# Patient Record
Sex: FEMALE | Race: WHITE | NOT HISPANIC OR LATINO | Employment: FULL TIME | URBAN - METROPOLITAN AREA
[De-identification: names, ages, dates, MRNs, and addresses within clinical notes are randomized per-mention and may not be internally consistent; named-entity substitution may affect disease eponyms.]

---

## 2020-02-03 ENCOUNTER — APPOINTMENT (OUTPATIENT)
Dept: RADIOLOGY | Facility: CLINIC | Age: 50
End: 2020-02-03
Payer: COMMERCIAL

## 2020-02-03 ENCOUNTER — OFFICE VISIT (OUTPATIENT)
Dept: URGENT CARE | Facility: CLINIC | Age: 50
End: 2020-02-03
Payer: COMMERCIAL

## 2020-02-03 VITALS
HEART RATE: 91 BPM | WEIGHT: 218 LBS | TEMPERATURE: 99 F | BODY MASS INDEX: 37.22 KG/M2 | HEIGHT: 64 IN | OXYGEN SATURATION: 96 % | SYSTOLIC BLOOD PRESSURE: 142 MMHG | DIASTOLIC BLOOD PRESSURE: 76 MMHG | RESPIRATION RATE: 18 BRPM

## 2020-02-03 DIAGNOSIS — R05.9 COUGH: Primary | ICD-10-CM

## 2020-02-03 DIAGNOSIS — R05.9 COUGH: ICD-10-CM

## 2020-02-03 PROCEDURE — 99213 OFFICE O/P EST LOW 20 MIN: CPT | Performed by: PHYSICIAN ASSISTANT

## 2020-02-03 PROCEDURE — 71046 X-RAY EXAM CHEST 2 VIEWS: CPT

## 2020-02-03 RX ORDER — LISINOPRIL 10 MG/1
10 TABLET ORAL DAILY
COMMUNITY

## 2020-02-03 RX ORDER — BENZONATATE 200 MG/1
200 CAPSULE ORAL 3 TIMES DAILY PRN
Qty: 20 CAPSULE | Refills: 0 | Status: SHIPPED | OUTPATIENT
Start: 2020-02-03

## 2020-02-03 RX ORDER — ESOMEPRAZOLE MAGNESIUM 40 MG/1
40 CAPSULE, DELAYED RELEASE ORAL 2 TIMES DAILY
COMMUNITY
Start: 2020-01-07

## 2020-02-03 RX ORDER — ALBUTEROL SULFATE 90 UG/1
2 AEROSOL, METERED RESPIRATORY (INHALATION) EVERY 6 HOURS PRN
Qty: 8.5 G | Refills: 0 | Status: SHIPPED | OUTPATIENT
Start: 2020-02-03 | End: 2020-03-13

## 2020-02-03 NOTE — PROGRESS NOTES
[address] [facility]        NAME: Preeti Garcia is a 54 y o  female  : 1970    MRN: [id number]  DATE: February 3, 2020  TIME: 12:58 PM    Assessment and Plan   Cough [R05]  1  Cough  XR chest pa & lateral    albuterol (PROAIR HFA) 90 mcg/act inhaler    benzonatate (TESSALON) 200 MG capsule         Patient Instructions     Use inhaler as directed  Take tessalon as directed  Encourage lots of fluids and rest  Discussed etiology is most likely viral  Follow up with PCP in 3-5 days  Proceed to  ER if symptoms worsen  Chief Complaint     Chief Complaint   Patient presents with    Cough     4 weeks, just keeps getting worse         History of Present Illness       Patient presents with complaint of cough x 4 weeks  Pt reports that she has been taking mucinex every 4 hours without any improvement in her symptoms  She also reports taking cough drops  Pt reports that she had a fever of 101 when she first started coughing but denies fever and chills since  Pt reports having night sweats frequently d/t menopause  She reports coughing to the point of vomiting today  She denies blood in sputum  She reports that her chest and back ache from all the coughing  She reports recent sick contacts  She denies getting a flu shot this year  Review of Systems   Review of Systems   Constitutional: Positive for fatigue  Negative for chills and fever  HENT: Positive for congestion and postnasal drip  Negative for ear pain and sore throat  Respiratory: Positive for cough  Negative for chest tightness, shortness of breath and wheezing  Cardiovascular: Negative for chest pain and palpitations  Musculoskeletal: Negative for myalgias  Skin: Negative for color change, rash and wound  Neurological: Negative for headaches  All other systems reviewed and are negative          Current Medications       Current Outpatient Medications:     esomeprazole (NexIUM) 40 MG capsule, Take 40 mg by mouth 2 (two) times a day, Disp: , Rfl:     lisinopril (ZESTRIL) 10 mg tablet, Take 10 mg by mouth daily, Disp: , Rfl:     albuterol (PROAIR HFA) 90 mcg/act inhaler, Inhale 2 puffs every 6 (six) hours as needed for wheezing or shortness of breath, Disp: 8 5 g, Rfl: 0    benzonatate (TESSALON) 200 MG capsule, Take 1 capsule (200 mg total) by mouth 3 (three) times a day as needed for cough, Disp: 20 capsule, Rfl: 0    Current Allergies     Allergies as of 02/03/2020    (No Known Allergies)            The following portions of the patient's history were reviewed and updated as appropriate: allergies, current medications, past family history, past medical history, past social history, past surgical history and problem list      History reviewed  No pertinent past medical history  History reviewed  No pertinent surgical history  History reviewed  No pertinent family history  Medications have been verified  Objective   /76   Pulse 91   Temp 99 °F (37 2 °C)   Resp 18   Ht 5' 4" (1 626 m)   Wt 98 9 kg (218 lb)   SpO2 96%   BMI 37 42 kg/m²        Physical Exam     Physical Exam   Constitutional: She is oriented to person, place, and time  She appears well-developed and well-nourished  No distress  Coughing   HENT:   Head: Normocephalic and atraumatic  Right Ear: External ear normal    Left Ear: External ear normal    Nose: Nose normal    Mouth/Throat: Oropharynx is clear and moist  No oropharyngeal exudate  Eyes: Pupils are equal, round, and reactive to light  Conjunctivae and EOM are normal  Right eye exhibits no discharge  Left eye exhibits no discharge  Neck: Normal range of motion  Neck supple  Cardiovascular: Normal rate, regular rhythm and normal heart sounds  Pulmonary/Chest: Effort normal and breath sounds normal  No stridor  No respiratory distress  She has no wheezes  She has no rales  Lymphadenopathy:     She has no cervical adenopathy     Neurological: She is alert and oriented to person, place, and time  No cranial nerve deficit or sensory deficit  Skin: Skin is warm and dry  Capillary refill takes less than 2 seconds  No rash noted  She is not diaphoretic  Psychiatric: She has a normal mood and affect  Her behavior is normal  Thought content normal    Nursing note and vitals reviewed

## 2020-02-03 NOTE — PATIENT INSTRUCTIONS
Use inhaler as directed  Take tessalon as directed  Encourage lots of fluids and rest  Discussed etiology is most likely viral  Follow up with PCP in 3-5 days  Go to ER if symptoms worsen

## 2020-02-20 ENCOUNTER — HOSPITAL ENCOUNTER (EMERGENCY)
Facility: HOSPITAL | Age: 50
Discharge: HOME | End: 2020-02-20
Attending: EMERGENCY MEDICINE | Admitting: EMERGENCY MEDICINE
Payer: COMMERCIAL

## 2020-02-20 VITALS
BODY MASS INDEX: 35.82 KG/M2 | RESPIRATION RATE: 16 BRPM | TEMPERATURE: 97.2 F | SYSTOLIC BLOOD PRESSURE: 150 MMHG | DIASTOLIC BLOOD PRESSURE: 85 MMHG | HEART RATE: 97 BPM | OXYGEN SATURATION: 98 % | HEIGHT: 65 IN | WEIGHT: 215 LBS

## 2020-02-20 DIAGNOSIS — R05.9 COUGH: Primary | ICD-10-CM

## 2020-02-20 DIAGNOSIS — R05.4 COUGH SYNCOPE: ICD-10-CM

## 2020-02-20 DIAGNOSIS — R55 COUGH SYNCOPE: ICD-10-CM

## 2020-02-20 PROCEDURE — 99283 EMERGENCY DEPT VISIT LOW MDM: CPT

## 2020-02-20 PROCEDURE — 93005 ELECTROCARDIOGRAM TRACING: CPT

## 2020-02-20 PROCEDURE — 99284 EMERGENCY DEPT VISIT MOD MDM: CPT | Performed by: EMERGENCY MEDICINE

## 2020-02-20 NOTE — ED PROVIDER NOTES
History  Chief Complaint   Patient presents with    Cough     Pt reports having a cough x 6 weeks @1530 pt took a gulp of water and choked on it, she woke up on the ground and "i felt myself convulsing and then i peed"  Denies pain     This 79-year-old female with history of hypertension has had a nonproductive cough for approximately 6 weeks  She felt she had some subjective fever the 1st few days my not since then  Paroxysms of cough come and go  When she lays down at night his gets more difficult to breathe and she coughs more so she has been sleeping some erect  Today she was drinking water when coughing spells began  She started choking on the water and believes he aspirated some  She then woke up on the floor  She felt tremors at noted she had small amount urinary incontinence  Currently she is back to baseline  Patient has some chest muscle tenderness but no other chest pain, hemoptysis, dyspnea, palpitations, pain or swelling in extremities  Patient denies recent trauma, surgery, immobilization or long distance travel  Patient denies personal or family history of thrombophilia  She does not take oral contraceptives nor smoke  Prior to Admission Medications   Prescriptions Last Dose Informant Patient Reported? Taking?    albuterol (PROAIR HFA) 90 mcg/act inhaler   No No   Sig: Inhale 2 puffs every 6 (six) hours as needed for wheezing or shortness of breath   benzonatate (TESSALON) 200 MG capsule   No No   Sig: Take 1 capsule (200 mg total) by mouth 3 (three) times a day as needed for cough   esomeprazole (NexIUM) 40 MG capsule   Yes No   Sig: Take 40 mg by mouth 2 (two) times a day   lisinopril (ZESTRIL) 10 mg tablet   Yes No   Sig: Take 10 mg by mouth daily      Facility-Administered Medications: None       Past Medical History:   Diagnosis Date    Hypertension        Past Surgical History:   Procedure Laterality Date    BREAST SURGERY      CARPAL TUNNEL RELEASE       SECTION         History reviewed  No pertinent family history  I have reviewed and agree with the history as documented  Social History     Tobacco Use    Smoking status: Former Smoker     Types: Cigarettes    Smokeless tobacco: Never Used   Substance Use Topics    Alcohol use: Not Currently     Frequency: Never    Drug use: Never       Review of Systems   Constitutional: Negative  HENT: Negative  Eyes: Negative  Respiratory: Positive for cough  Negative for wheezing and stridor  Cardiovascular: Negative  Gastrointestinal: Negative  Endocrine: Negative  Genitourinary: Negative  Musculoskeletal: Negative  Skin: Negative  Allergic/Immunologic: Negative  Neurological: Negative  Hematological: Negative  Psychiatric/Behavioral: Negative  All other systems reviewed and are negative  Physical Exam  Physical Exam   Constitutional: She is oriented to person, place, and time  She appears well-developed and well-nourished  No distress  HENT:   Head: Normocephalic and atraumatic  Mouth/Throat: Oropharynx is clear and moist    Eyes: Pupils are equal, round, and reactive to light  Conjunctivae and EOM are normal    Neck: Normal range of motion  Neck supple  No JVD present  Cardiovascular: Normal rate, regular rhythm and intact distal pulses  No murmur heard  Pulmonary/Chest: Effort normal and breath sounds normal  No respiratory distress  She has no wheezes  She has no rales  Abdominal: Soft  Bowel sounds are normal  She exhibits no distension  There is no tenderness  Musculoskeletal: Normal range of motion  She exhibits no edema  Neurological: She is alert and oriented to person, place, and time  She has normal reflexes  No cranial nerve deficit  Coordination normal    Skin: Skin is warm and dry  Capillary refill takes less than 2 seconds  No rash noted  She is not diaphoretic  Psychiatric: She has a normal mood and affect     Nursing note and vitals reviewed  Vital Signs  ED Triage Vitals [02/20/20 1633]   Temperature Pulse Respirations Blood Pressure SpO2   (!) 97 2 °F (36 2 °C) 97 16 150/85 98 %      Temp Source Heart Rate Source Patient Position - Orthostatic VS BP Location FiO2 (%)   Temporal Monitor Sitting Right arm --      Pain Score       6           Vitals:    02/20/20 1633   BP: 150/85   Pulse: 97   Patient Position - Orthostatic VS: Sitting         Visual Acuity      ED Medications  Medications - No data to display    Diagnostic Studies  Results Reviewed     None                 No orders to display              Procedures  ECG 12 Lead Documentation Only  Date/Time: 2/20/2020 5:10 PM  Performed by: Jade Mercado DO  Authorized by: Jade Mercado DO     ECG reviewed by me, the ED Provider: yes    Patient location:  ED  Previous ECG:     Previous ECG:  Unavailable  Interpretation:     Interpretation: normal               ED Course                               MDM  Number of Diagnoses or Management Options  Cough syncope:   Cough:   Diagnosis management comments: Patient with stable vital signs and normal exam   History not suggestive of more serious underlying pathology  Likely vasovagal episode due to choking while coughing  Amount and/or Complexity of Data Reviewed  Review and summarize past medical records: yes  Independent visualization of images, tracings, or specimens: yes          Disposition  Final diagnoses:   Cough   Cough syncope     Time reflects when diagnosis was documented in both MDM as applicable and the Disposition within this note     Time User Action Codes Description Comment    2/20/2020  5:33 PM Vladimir Felt Add [R05] Cough     2/20/2020  5:33 PM Vladimir Needles Add [R05] Cough syncope       ED Disposition     ED Disposition Condition Date/Time Comment    Discharge Stable Thu Feb 20, 2020  5:33 PM Allegra Simeon discharge to home/self care              Follow-up Information     Follow up With Specialties Details Why Contact Info Additional Information    [facility] Schedule an appointment as soon as possible for a visit  Regarding your cough [phone]  [facility] [phone], Tonya Galvan, [place], [place], [address] [place] [address]          Patient's Medications   Discharge Prescriptions    No medications on file     No discharge procedures on file      PDMP Review     None          ED Provider  Electronically Signed by           Nimo Hare DO  02/20/20 0327

## 2020-02-20 NOTE — DISCHARGE INSTRUCTIONS
Let the pulmonary office know that you were here and we think you should be seen soon  Follow-up with family doctor as well  Return if problem arises

## 2020-02-21 LAB
ATRIAL RATE: 77 BPM
P AXIS: 57 DEGREES
PR INTERVAL: 178 MS
QRS AXIS: 53 DEGREES
QRSD INTERVAL: 84 MS
QT INTERVAL: 372 MS
QTC INTERVAL: 420 MS
T WAVE AXIS: 50 DEGREES
VENTRICULAR RATE: 77 BPM

## 2020-02-21 PROCEDURE — 93010 ELECTROCARDIOGRAM REPORT: CPT | Performed by: INTERNAL MEDICINE

## 2020-02-28 ENCOUNTER — OFFICE VISIT (OUTPATIENT)
Dept: PULMONOLOGY | Facility: CLINIC | Age: 50
End: 2020-02-28
Payer: COMMERCIAL

## 2020-02-28 VITALS
DIASTOLIC BLOOD PRESSURE: 86 MMHG | TEMPERATURE: 98.1 F | RESPIRATION RATE: 18 BRPM | HEIGHT: 65 IN | BODY MASS INDEX: 35.49 KG/M2 | HEART RATE: 85 BPM | OXYGEN SATURATION: 96 % | WEIGHT: 213 LBS | SYSTOLIC BLOOD PRESSURE: 130 MMHG

## 2020-02-28 DIAGNOSIS — F17.201 MODERATE TOBACCO DEPENDENCE IN SUSTAINED REMISSION: ICD-10-CM

## 2020-02-28 DIAGNOSIS — R05.8 ALLERGIC COUGH: Primary | ICD-10-CM

## 2020-02-28 DIAGNOSIS — K21.9 GASTROESOPHAGEAL REFLUX DISEASE WITHOUT ESOPHAGITIS: ICD-10-CM

## 2020-02-28 PROCEDURE — 99204 OFFICE O/P NEW MOD 45 MIN: CPT | Performed by: INTERNAL MEDICINE

## 2020-02-28 RX ORDER — FERROUS SULFATE 325(65) MG
1 TABLET ORAL 2 TIMES DAILY
COMMUNITY
Start: 2020-02-13

## 2020-02-28 RX ORDER — LEVOCETIRIZINE DIHYDROCHLORIDE 5 MG/1
5 TABLET, FILM COATED ORAL EVERY EVENING
Qty: 30 TABLET | Refills: 3 | Status: SHIPPED | OUTPATIENT
Start: 2020-02-28 | End: 2020-07-21

## 2020-02-28 RX ORDER — FLUTICASONE PROPIONATE 50 MCG
1 SPRAY, SUSPENSION (ML) NASAL DAILY
Qty: 1 BOTTLE | Refills: 5 | Status: SHIPPED | OUTPATIENT
Start: 2020-02-28

## 2020-02-28 RX ORDER — LISINOPRIL AND HYDROCHLOROTHIAZIDE 20; 12.5 MG/1; MG/1
2 TABLET ORAL DAILY
COMMUNITY
Start: 2020-02-13

## 2020-02-28 RX ORDER — MONTELUKAST SODIUM 10 MG/1
10 TABLET ORAL
Qty: 30 TABLET | Refills: 3 | Status: SHIPPED | OUTPATIENT
Start: 2020-02-28 | End: 2020-07-21

## 2020-02-28 NOTE — ASSESSMENT & PLAN NOTE
Suspect her cough is secondary to her sinus symptoms  I recommend initiating sinus washes    Start nasal steroid spray  Start allergy medicine such as Levocetirizine  Start Montelukast

## 2020-02-28 NOTE — ASSESSMENT & PLAN NOTE
No known airway obstruction  Currently does not have symptoms of breathlessness  Check PFTs in future if indicated

## 2020-02-28 NOTE — PROGRESS NOTES
Pulmonary Consultation   Sharyn Araujo 54 y o  female MRN: [id number]  2/28/2020      Referring provider: Dr Delmis Fang  Reason for consult: Cough    Assessment and Plan:  Allergic cough  Suspect her cough is secondary to her sinus symptoms  I recommend initiating sinus washes  Start nasal steroid spray  Start allergy medicine such as Levocetirizine  Start Montelukast    GERD (gastroesophageal reflux disease)  - Cont  PPI   - Cont  Diet modification  - Symptoms seem controlled, doubt they are the cause of her cough  Moderate tobacco dependence in sustained remission  No known airway obstruction  Currently does not have symptoms of breathlessness  Check PFTs in future if indicated  Diagnoses and all orders for this visit:    Allergic cough  -     fluticasone (FLONASE) 50 mcg/act nasal spray; 1 spray into each nostril daily  -     montelukast (SINGULAIR) 10 mg tablet; Take 1 tablet (10 mg total) by mouth daily at bedtime  -     levocetirizine (XYZAL) 5 MG tablet; Take 1 tablet (5 mg total) by mouth every evening    Gastroesophageal reflux disease without esophagitis    Moderate tobacco dependence in sustained remission    Other orders  -     FEROSUL 325 (65 Fe) MG tablet; Take 1 tablet by mouth 2 (two) times a day  -     lisinopril-hydrochlorothiazide (PRINZIDE,ZESTORETIC) 20-12 5 MG per tablet; Take 2 tablets by mouth daily     She will call to schedule appointment for further evaluation if symptoms do not improve with the above regimen  History of Present Illness   HPI:  Sharyn Araujo is a 54 y o  female who presents for evaluation of cough  She was sick six weeks ago with a fever  She has had a persistent cough  It was initially productive but now it is dry  It is worse when she lies flat  She has tried cough medication  She was prescribed an Albuterol inhaler, which she finds helpful  She has taken multiple antitussives with some varying relief  She was once told she had asthma   She is not short of breath  She can ambulate at least a block without stopping  She has seasonal allergies  She has constant sinus troubles  She has had surgery in the past  She is using Afrin nightly  She has known GERD  She takes a PPI BID       Review of Systems  GEN: no fever or chills  HENT: +sinus congestion, no postnasal drip, no rhinorrhea  EYES: no visual changes  RESP: no shortness of breath, +cough, no wheezing  CARDIO: no chest pain, no leg edema  GI: no abdominal pain, no diarrhea  ENDO:  no polydipsia  : no urgency, no dysuria  MSK: no back pain  ALLERGY: not immunocompromised  NEURO: no dizziness, no seizures  HEME: does not bruise easily  PSYCH: no hallucinations    Historical Information   Past Medical History:   Diagnosis Date    Hypertension      Past Surgical History:   Procedure Laterality Date    BREAST SURGERY      CARPAL TUNNEL RELEASE       SECTION       Family History:   Grandfather had black lung    Occupational History: [employer]    Social History: quit 20 years ago; smoked 2 ppd x 8 years  Pets: cat - does not affect her breathing  Secondhand smoke exposure: None  Lives with boyfriend and cat    Meds/Allergies     Current Outpatient Medications:     albuterol (PROAIR HFA) 90 mcg/act inhaler, Inhale 2 puffs every 6 (six) hours as needed for wheezing or shortness of breath, Disp: 8 5 g, Rfl: 0    esomeprazole (NexIUM) 40 MG capsule, Take 40 mg by mouth 2 (two) times a day, Disp: , Rfl:     lisinopril-hydrochlorothiazide (PRINZIDE,ZESTORETIC) 20-12 5 MG per tablet, Take 2 tablets by mouth daily, Disp: , Rfl:     benzonatate (TESSALON) 200 MG capsule, Take 1 capsule (200 mg total) by mouth 3 (three) times a day as needed for cough (Patient not taking: Reported on 2020), Disp: 20 capsule, Rfl: 0    FEROSUL 325 (65 Fe) MG tablet, Take 1 tablet by mouth 2 (two) times a day, Disp: , Rfl:     lisinopril (ZESTRIL) 10 mg tablet, Take 10 mg by mouth daily, Disp: , Rfl:   No Known Allergies    Vitals: Blood pressure 130/86, pulse 85, temperature 98 1 °F (36 7 °C), resp  rate 18, height 5' 5" (1 651 m), weight 96 6 kg (213 lb), last menstrual period 01/30/2020, SpO2 96 %  , Body mass index is 35 45 kg/m²  Oxygen Therapy  SpO2: 96 %  Oxygen Therapy: None (Room air)    Physical Exam  GEN: WDWN, nad, comfortable  HEENT: NCAT, EOMI  CVS: Regular  LUNGS: Clear b/l b s , no wheezing  ABD: soft  EXT: No c/c/e  NEURO: No focal deficits  MS: Moving all extremities  SKIN: warm, dry  PSYCH: calm, cooperative      Imaging and other studies: I have personally reviewed pertinent films in PACS  Chest x-ray reviewed by me personally shows no acute disease  EKG, Pathology, and Other Studies: I have personally reviewed pertinent reports  EKG February 2020 shows normal sinus rhythm    [facility]

## 2020-02-28 NOTE — ASSESSMENT & PLAN NOTE
- Cont  PPI   - Cont  Diet modification  - Symptoms seem controlled, doubt they are the cause of her cough

## 2020-03-13 DIAGNOSIS — J44.9 CHRONIC OBSTRUCTIVE PULMONARY DISEASE, UNSPECIFIED COPD TYPE (HCC): Primary | ICD-10-CM

## 2020-03-13 RX ORDER — ALBUTEROL SULFATE 90 UG/1
2 AEROSOL, METERED RESPIRATORY (INHALATION) EVERY 6 HOURS PRN
Qty: 18 G | Refills: 5 | Status: SHIPPED | OUTPATIENT
Start: 2020-03-13

## 2020-07-21 DIAGNOSIS — R05.8 ALLERGIC COUGH: ICD-10-CM

## 2020-07-21 RX ORDER — LEVOCETIRIZINE DIHYDROCHLORIDE 5 MG/1
5 TABLET, FILM COATED ORAL EVERY EVENING
Qty: 30 TABLET | Refills: 5 | Status: SHIPPED | OUTPATIENT
Start: 2020-07-21

## 2020-07-21 RX ORDER — MONTELUKAST SODIUM 10 MG/1
10 TABLET ORAL
Qty: 30 TABLET | Refills: 3 | Status: SHIPPED | OUTPATIENT
Start: 2020-07-21 | End: 2020-11-27

## 2020-08-18 ENCOUNTER — HOSPITAL ENCOUNTER (EMERGENCY)
Facility: HOSPITAL | Age: 50
Discharge: HOME | End: 2020-08-18
Attending: EMERGENCY MEDICINE | Admitting: EMERGENCY MEDICINE
Payer: COMMERCIAL

## 2020-08-18 ENCOUNTER — APPOINTMENT (EMERGENCY)
Dept: CT IMAGING | Facility: HOSPITAL | Age: 50
End: 2020-08-18
Payer: COMMERCIAL

## 2020-08-18 VITALS
TEMPERATURE: 97.9 F | OXYGEN SATURATION: 97 % | HEART RATE: 89 BPM | SYSTOLIC BLOOD PRESSURE: 152 MMHG | DIASTOLIC BLOOD PRESSURE: 88 MMHG | RESPIRATION RATE: 19 BRPM

## 2020-08-18 DIAGNOSIS — K57.90 DIVERTICULOSIS: ICD-10-CM

## 2020-08-18 DIAGNOSIS — K52.9 GASTROENTERITIS: Primary | ICD-10-CM

## 2020-08-18 DIAGNOSIS — K76.0 FATTY LIVER: ICD-10-CM

## 2020-08-18 LAB
ALBUMIN SERPL BCP-MCNC: 4 G/DL (ref 3.5–5)
ALP SERPL-CCNC: 87 U/L (ref 46–116)
ALT SERPL W P-5'-P-CCNC: 46 U/L (ref 12–78)
ANION GAP SERPL CALCULATED.3IONS-SCNC: 11 MMOL/L (ref 4–13)
AST SERPL W P-5'-P-CCNC: 30 U/L (ref 5–45)
BASOPHILS # BLD AUTO: 0.08 THOUSANDS/ΜL (ref 0–0.1)
BASOPHILS NFR BLD AUTO: 1 % (ref 0–1)
BILIRUB SERPL-MCNC: 0.2 MG/DL (ref 0.2–1)
BUN SERPL-MCNC: 10 MG/DL (ref 5–25)
CALCIUM SERPL-MCNC: 8.7 MG/DL (ref 8.3–10.1)
CHLORIDE SERPL-SCNC: 98 MMOL/L (ref 100–108)
CO2 SERPL-SCNC: 27 MMOL/L (ref 21–32)
CREAT SERPL-MCNC: 0.98 MG/DL (ref 0.6–1.3)
EOSINOPHIL # BLD AUTO: 0.22 THOUSAND/ΜL (ref 0–0.61)
EOSINOPHIL NFR BLD AUTO: 2 % (ref 0–6)
ERYTHROCYTE [DISTWIDTH] IN BLOOD BY AUTOMATED COUNT: 12.5 % (ref 11.6–15.1)
EXT PREG TEST URINE: NEGATIVE
EXT. CONTROL ED NAV: NORMAL
GFR SERPL CREATININE-BSD FRML MDRD: 69 ML/MIN/1.73SQ M
GLUCOSE SERPL-MCNC: 119 MG/DL (ref 65–140)
HCT VFR BLD AUTO: 41.6 % (ref 34.8–46.1)
HGB BLD-MCNC: 14.3 G/DL (ref 11.5–15.4)
IMM GRANULOCYTES # BLD AUTO: 0.04 THOUSAND/UL (ref 0–0.2)
IMM GRANULOCYTES NFR BLD AUTO: 0 % (ref 0–2)
LIPASE SERPL-CCNC: 203 U/L (ref 73–393)
LYMPHOCYTES # BLD AUTO: 2.79 THOUSANDS/ΜL (ref 0.6–4.47)
LYMPHOCYTES NFR BLD AUTO: 29 % (ref 14–44)
MCH RBC QN AUTO: 30.4 PG (ref 26.8–34.3)
MCHC RBC AUTO-ENTMCNC: 34.4 G/DL (ref 31.4–37.4)
MCV RBC AUTO: 89 FL (ref 82–98)
MONOCYTES # BLD AUTO: 0.74 THOUSAND/ΜL (ref 0.17–1.22)
MONOCYTES NFR BLD AUTO: 8 % (ref 4–12)
NEUTROPHILS # BLD AUTO: 5.74 THOUSANDS/ΜL (ref 1.85–7.62)
NEUTS SEG NFR BLD AUTO: 60 % (ref 43–75)
NRBC BLD AUTO-RTO: 0 /100 WBCS
PLATELET # BLD AUTO: 204 THOUSANDS/UL (ref 149–390)
PMV BLD AUTO: 10.7 FL (ref 8.9–12.7)
POTASSIUM SERPL-SCNC: 3.5 MMOL/L (ref 3.5–5.3)
PROT SERPL-MCNC: 8.2 G/DL (ref 6.4–8.2)
RBC # BLD AUTO: 4.7 MILLION/UL (ref 3.81–5.12)
SODIUM SERPL-SCNC: 136 MMOL/L (ref 136–145)
WBC # BLD AUTO: 9.61 THOUSAND/UL (ref 4.31–10.16)

## 2020-08-18 PROCEDURE — 99284 EMERGENCY DEPT VISIT MOD MDM: CPT

## 2020-08-18 PROCEDURE — 81025 URINE PREGNANCY TEST: CPT | Performed by: EMERGENCY MEDICINE

## 2020-08-18 PROCEDURE — G1004 CDSM NDSC: HCPCS

## 2020-08-18 PROCEDURE — 36415 COLL VENOUS BLD VENIPUNCTURE: CPT | Performed by: EMERGENCY MEDICINE

## 2020-08-18 PROCEDURE — 99284 EMERGENCY DEPT VISIT MOD MDM: CPT | Performed by: EMERGENCY MEDICINE

## 2020-08-18 PROCEDURE — 85025 COMPLETE CBC W/AUTO DIFF WBC: CPT | Performed by: EMERGENCY MEDICINE

## 2020-08-18 PROCEDURE — 80053 COMPREHEN METABOLIC PANEL: CPT | Performed by: EMERGENCY MEDICINE

## 2020-08-18 PROCEDURE — 74177 CT ABD & PELVIS W/CONTRAST: CPT

## 2020-08-18 PROCEDURE — 96374 THER/PROPH/DIAG INJ IV PUSH: CPT

## 2020-08-18 PROCEDURE — 96375 TX/PRO/DX INJ NEW DRUG ADDON: CPT

## 2020-08-18 PROCEDURE — 83690 ASSAY OF LIPASE: CPT | Performed by: EMERGENCY MEDICINE

## 2020-08-18 PROCEDURE — 96361 HYDRATE IV INFUSION ADD-ON: CPT

## 2020-08-18 RX ORDER — ONDANSETRON 4 MG/1
4 TABLET, ORALLY DISINTEGRATING ORAL EVERY 8 HOURS PRN
Qty: 20 TABLET | Refills: 0 | Status: SHIPPED | OUTPATIENT
Start: 2020-08-18 | End: 2020-08-25

## 2020-08-18 RX ORDER — DICYCLOMINE HCL 20 MG
20 TABLET ORAL 4 TIMES DAILY PRN
Qty: 20 TABLET | Refills: 0 | Status: SHIPPED | OUTPATIENT
Start: 2020-08-18 | End: 2020-08-25

## 2020-08-18 RX ORDER — ONDANSETRON 2 MG/ML
4 INJECTION INTRAMUSCULAR; INTRAVENOUS ONCE
Status: COMPLETED | OUTPATIENT
Start: 2020-08-18 | End: 2020-08-18

## 2020-08-18 RX ORDER — KETOROLAC TROMETHAMINE 30 MG/ML
30 INJECTION, SOLUTION INTRAMUSCULAR; INTRAVENOUS ONCE
Status: COMPLETED | OUTPATIENT
Start: 2020-08-18 | End: 2020-08-18

## 2020-08-18 RX ADMIN — ONDANSETRON 4 MG: 2 INJECTION INTRAMUSCULAR; INTRAVENOUS at 22:26

## 2020-08-18 RX ADMIN — SODIUM CHLORIDE 1000 ML: 0.9 INJECTION, SOLUTION INTRAVENOUS at 22:28

## 2020-08-18 RX ADMIN — IOHEXOL 100 ML: 350 INJECTION, SOLUTION INTRAVENOUS at 23:00

## 2020-08-18 RX ADMIN — KETOROLAC TROMETHAMINE 30 MG: 30 INJECTION, SOLUTION INTRAMUSCULAR at 22:27

## 2020-08-19 NOTE — ED PROVIDER NOTES
History  Chief Complaint   Patient presents with    Abdominal Pain     patient states she was suppose to get her gallbladder removed but could not because of covid      26-year-old female complaining right upper quadrant crampy abdominal pain 3 weeks now with associated frequent watery diarrhea  Similar symptoms in the past and had ultrasound at [facility] was told she had sludge in the gallbladder but has not followed up due to coronavirus  Patient states she may have eaten bad fish  She has been trying Pepto diarrhea medicine without any relief  She denies any blood in the bowel movement or fever chills  Patient states pain radiates to her whole belly  No urinary frequency burning or pain but she does have dark urine  She is still having her periods  Prior to Admission Medications   Prescriptions Last Dose Informant Patient Reported? Taking?    FEROSUL 325 (65 Fe) MG tablet  Self Yes Yes   Sig: Take 1 tablet by mouth 2 (two) times a day   albuterol (Ventolin HFA) 90 mcg/act inhaler Not Taking at Unknown time  No No   Sig: Inhale 2 puffs every 6 (six) hours as needed for wheezing   Patient not taking: Reported on 2020   benzonatate (TESSALON) 200 MG capsule Not Taking at Unknown time Self No No   Sig: Take 1 capsule (200 mg total) by mouth 3 (three) times a day as needed for cough   Patient not taking: Reported on 2020   esomeprazole (NexIUM) 40 MG capsule  Self Yes Yes   Sig: Take 40 mg by mouth 2 (two) times a day   fluticasone (FLONASE) 50 mcg/act nasal spray Not Taking at Unknown time  No No   Si spray into each nostril daily   Patient not taking: Reported on 2020   levocetirizine (XYZAL) 5 MG tablet Not Taking at Unknown time  No No   Sig: Take 1 tablet (5 mg total) by mouth every evening   Patient not taking: Reported on 2020   lisinopril (ZESTRIL) 10 mg tablet Not Taking at Unknown time Self Yes No   Sig: Take 10 mg by mouth daily lisinopril-hydrochlorothiazide (PRINZIDE,ZESTORETIC) 20-12 5 MG per tablet  Self Yes Yes   Sig: Take 2 tablets by mouth daily   montelukast (SINGULAIR) 10 mg tablet Not Taking at Unknown time  No No   Sig: Take 1 tablet (10 mg total) by mouth daily at bedtime   Patient not taking: Reported on 2020      Facility-Administered Medications: None       Past Medical History:   Diagnosis Date    Hypertension        Past Surgical History:   Procedure Laterality Date    BREAST SURGERY      CARPAL TUNNEL RELEASE       SECTION         History reviewed  No pertinent family history  I have reviewed and agree with the history as documented  E-Cigarette/Vaping    E-Cigarette Use Never User      E-Cigarette/Vaping Substances    Nicotine No     THC No     CBD No     Flavoring No     Other No      Social History     Tobacco Use    Smoking status: Former Smoker     Packs/day: 2 00     Years: 10      Pack years: 20      Types: Cigarettes     Start date:      Last attempt to quit:      Years since quittin 6    Smokeless tobacco: Never Used   Substance Use Topics    Alcohol use: Not Currently     Frequency: Never    Drug use: Never       Review of Systems   All other systems reviewed and are negative  Physical Exam  Physical Exam  Vitals signs and nursing note reviewed  Constitutional:       Appearance: She is well-developed  Eyes:      Conjunctiva/sclera: Conjunctivae normal       Pupils: Pupils are equal, round, and reactive to light  Neck:      Musculoskeletal: Normal range of motion and neck supple  No spinous process tenderness  Cardiovascular:      Rate and Rhythm: Normal rate and regular rhythm  Heart sounds: Normal heart sounds  Pulmonary:      Effort: Pulmonary effort is normal  No respiratory distress  Breath sounds: Normal breath sounds  No wheezing  Abdominal:      General: Bowel sounds are normal  There is no distension        Palpations: Abdomen is soft       Tenderness: There is abdominal tenderness in the right upper quadrant and epigastric area  There is no right CVA tenderness, left CVA tenderness, guarding or rebound  Musculoskeletal: Normal range of motion  Skin:     General: Skin is warm and dry  Findings: No rash  Neurological:      Mental Status: She is alert  GCS: GCS eye subscore is 4  GCS verbal subscore is 5  GCS motor subscore is 6  Sensory: No sensory deficit           Vital Signs  ED Triage Vitals   Temperature Pulse Respirations Blood Pressure SpO2   08/18/20 2133 08/18/20 2133 08/18/20 2133 08/18/20 2133 08/18/20 2133   97 9 °F (36 6 °C) 98 19 152/88 99 %      Temp Source Heart Rate Source Patient Position - Orthostatic VS BP Location FiO2 (%)   08/18/20 2133 08/18/20 2133 08/18/20 2133 08/18/20 2133 --   Tympanic Monitor Sitting Right arm       Pain Score       08/18/20 2227       8           Vitals:    08/18/20 2133 08/18/20 2145 08/18/20 2200 08/18/20 2245   BP: 152/88      Pulse: 98 89 94 89   Patient Position - Orthostatic VS: Sitting            Visual Acuity      ED Medications  Medications   ondansetron (ZOFRAN) injection 4 mg (4 mg Intravenous Given 8/18/20 2226)   ketorolac (TORADOL) injection 30 mg (30 mg Intravenous Given 8/18/20 2227)   sodium chloride 0 9 % bolus 1,000 mL (0 mL Intravenous Stopped 8/18/20 2338)   iohexol (OMNIPAQUE) 350 MG/ML injection (MULTI-DOSE) 100 mL (100 mL Intravenous Given 8/18/20 2300)       Diagnostic Studies  Results Reviewed     Procedure Component Value Units Date/Time    CMP [[id number]]  (Abnormal) Collected:  08/18/20 2218    Lab Status:  Final result Specimen:  Blood from Arm, Right Updated:  08/18/20 2240     Sodium 136 mmol/L      Potassium 3 5 mmol/L      Chloride 98 mmol/L      CO2 27 mmol/L      ANION GAP 11 mmol/L      BUN 10 mg/dL      Creatinine 0 98 mg/dL      Glucose 119 mg/dL      Calcium 8 7 mg/dL      AST 30 U/L      ALT 46 U/L      Alkaline Phosphatase 87 U/L Total Protein 8 2 g/dL      Albumin 4 0 g/dL      Total Bilirubin 0 20 mg/dL      eGFR 69 ml/min/1 73sq m     Narrative:       [place] guidelines for Chronic Kidney Disease (CKD):     Stage 1 with normal or high GFR (GFR > 90 mL/min/1 73 square meters)    Stage 2 Mild CKD (GFR = 60-89 mL/min/1 73 square meters)    Stage 3A Moderate CKD (GFR = 45-59 mL/min/1 73 square meters)    Stage 3B Moderate CKD (GFR = 30-44 mL/min/1 73 square meters)    Stage 4 Severe CKD (GFR = 15-29 mL/min/1 73 square meters)    Stage 5 End Stage CKD (GFR <15 mL/min/1 73 square meters)  Note: GFR calculation is accurate only with a steady state creatinine    Lipase [[id number]]  (Normal) Collected:  08/18/20 2218    Lab Status:  Final result Specimen:  Blood from Arm, Right Updated:  08/18/20 2240     Lipase 203 u/L     POCT pregnancy, urine [[id number]]  (Normal) Resulted:  08/18/20 2226    Lab Status:  Final result Specimen:  Urine Updated:  08/18/20 2226     EXT PREG TEST UR (Ref: Negative) negative     Control valid    CBC and differential [[id number]] Collected:  08/18/20 2218    Lab Status:  Final result Specimen:  Blood from Arm, Right Updated:  08/18/20 2224     WBC 9 61 Thousand/uL      RBC 4 70 Million/uL      Hemoglobin 14 3 g/dL      Hematocrit 41 6 %      MCV 89 fL      MCH 30 4 pg      MCHC 34 4 g/dL      RDW 12 5 %      MPV 10 7 fL      Platelets 102 Thousands/uL      nRBC 0 /100 WBCs      Neutrophils Relative 60 %      Immat GRANS % 0 %      Lymphocytes Relative 29 %      Monocytes Relative 8 %      Eosinophils Relative 2 %      Basophils Relative 1 %      Neutrophils Absolute 5 74 Thousands/µL      Immature Grans Absolute 0 04 Thousand/uL      Lymphocytes Absolute 2 79 Thousands/µL      Monocytes Absolute 0 74 Thousand/µL      Eosinophils Absolute 0 22 Thousand/µL      Basophils Absolute 0 08 Thousands/µL                  CT Abdomen pelvis with contrast   Final Result by  (08/19 0010)   Addendum 1 of 1 by Griselda Jaimes MD (08/18 3410)   ADDENDUM:      In the body the report, under the gallbladder section, it states that the    gallbladder is surgically absent  However, the gallbladder is visualized on series 2, image 29 and appears    normal       Final                 Procedures  Procedures         ED Course       US AUDIT      Most Recent Value   Initial Alcohol Screen: US AUDIT-C    1  How often do you have a drink containing alcohol?  0 Filed at: 08/18/2020 2132   2  How many drinks containing alcohol do you have on a typical day you are drinking? 0 Filed at: 08/18/2020 2132   3a  Male UNDER 65: How often do you have five or more drinks on one occasion? 0 Filed at: 08/18/2020 2132   3b  FEMALE Any Age, or MALE 65+: How often do you have 4 or more drinks on one occassion? 0 Filed at: 08/18/2020 2132   Audit-C Score  0 Filed at: 08/18/2020 2132                  NORMA/DAST-10      Most Recent Value   How many times in the past year have you    Used an illegal drug or used a prescription medication for non-medical reasons?   Never Filed at: 08/18/2020 2132                                MDM  Number of Diagnoses or Management Options  Diverticulosis: new and requires workup  Fatty liver: new and requires workup  Gastroenteritis: new and requires workup     Amount and/or Complexity of Data Reviewed  Clinical lab tests: ordered and reviewed  Tests in the radiology section of CPT®: ordered and reviewed    Patient Progress  Patient progress: improved        Disposition  Final diagnoses:   Gastroenteritis   Diverticulosis   Fatty liver     Time reflects when diagnosis was documented in both MDM as applicable and the Disposition within this note     Time User Action Codes Description Comment    8/18/2020 11:43 PM Zuleima Plunkett [K52 9] Gastroenteritis     8/18/2020 11:45 PM Thaddeus Oglesby Add [K57 90] Diverticulosis     8/18/2020 11:46 PM Thaddeus Valentinboy Add [K76 0] Fatty liver       ED Disposition     ED Disposition Condition Date/Time Comment    Discharge Stable Tue Aug 18, 2020 11:43 PM Kristie Holly discharge to home/self care              Follow-up Information     Follow up With Specialties Details Why Contact Info    your surgeon   As needed           Discharge Medication List as of 8/18/2020 11:47 PM      START taking these medications    Details   dicyclomine (BENTYL) 20 mg tablet Take 1 tablet (20 mg total) by mouth 4 (four) times a day as needed (abdominal cramps) for up to 7 days, Starting Tue 8/18/2020, Until Tue 8/25/2020, Normal      ondansetron (ZOFRAN-ODT) 4 mg disintegrating tablet Take 1 tablet (4 mg total) by mouth every 8 (eight) hours as needed for nausea or vomiting for up to 7 days, Starting Tue 8/18/2020, Until Tue 8/25/2020, Normal         CONTINUE these medications which have NOT CHANGED    Details   esomeprazole (NexIUM) 40 MG capsule Take 40 mg by mouth 2 (two) times a day, Starting Tue 1/7/2020, Historical Med      FEROSUL 325 (65 Fe) MG tablet Take 1 tablet by mouth 2 (two) times a day, Starting Thu 2/13/2020, Historical Med      lisinopril-hydrochlorothiazide (PRINZIDE,ZESTORETIC) 20-12 5 MG per tablet Take 2 tablets by mouth daily, Starting Thu 2/13/2020, Historical Med      albuterol (Ventolin HFA) 90 mcg/act inhaler Inhale 2 puffs every 6 (six) hours as needed for wheezing, Starting Fri 3/13/2020, Normal      benzonatate (TESSALON) 200 MG capsule Take 1 capsule (200 mg total) by mouth 3 (three) times a day as needed for cough, Starting Mon 2/3/2020, Normal      fluticasone (FLONASE) 50 mcg/act nasal spray 1 spray into each nostril daily, Starting Fri 2/28/2020, Normal      levocetirizine (XYZAL) 5 MG tablet Take 1 tablet (5 mg total) by mouth every evening, Starting Tue 7/21/2020, Normal      lisinopril (ZESTRIL) 10 mg tablet Take 10 mg by mouth daily, Historical Med      montelukast (SINGULAIR) 10 mg tablet Take 1 tablet (10 mg total) by mouth daily at bedtime, Starting Tue 7/21/2020, Normal           No discharge procedures on file      PDMP Review     None          ED Provider  Electronically Signed by           Tian Aponte,   08/19/20 0010

## 2020-11-26 DIAGNOSIS — R05.8 ALLERGIC COUGH: ICD-10-CM

## 2020-11-27 RX ORDER — MONTELUKAST SODIUM 10 MG/1
TABLET ORAL
Qty: 30 TABLET | Refills: 3 | Status: SHIPPED | OUTPATIENT
Start: 2020-11-27

## 2021-02-10 DIAGNOSIS — R05.8 ALLERGIC COUGH: ICD-10-CM

## 2021-02-10 RX ORDER — LEVOCETIRIZINE DIHYDROCHLORIDE 5 MG/1
TABLET, FILM COATED ORAL
Qty: 30 TABLET | Refills: 5 | OUTPATIENT
Start: 2021-02-10

## 2021-02-10 NOTE — TELEPHONE ENCOUNTER
Can you see if this pt wants to be seen? I keep refilling her Levocetirizine but she hasn't been seen in a year, so we should check in

## 2021-02-10 NOTE — TELEPHONE ENCOUNTER
Called and spoke with pt, she is not currently using the Levocetirizine and does not not wish to schedule at this time  She will contact us in the future if she wants to schedule follow up

## 2021-08-12 ENCOUNTER — HOSPITAL ENCOUNTER (OUTPATIENT)
Dept: RADIOLOGY | Facility: HOSPITAL | Age: 51
Discharge: HOME | End: 2021-08-12
Attending: PODIATRIST
Payer: COMMERCIAL

## 2021-08-12 DIAGNOSIS — G57.62 LESION OF LEFT PLANTAR NERVE: ICD-10-CM

## 2021-08-12 DIAGNOSIS — G57.61 LESION OF RIGHT PLANTAR NERVE: ICD-10-CM

## 2021-08-12 PROCEDURE — 73630 X-RAY EXAM OF FOOT: CPT

## 2022-03-14 ENCOUNTER — OFFICE VISIT (OUTPATIENT)
Dept: URGENT CARE | Facility: CLINIC | Age: 52
End: 2022-03-14
Payer: COMMERCIAL

## 2022-03-14 ENCOUNTER — APPOINTMENT (OUTPATIENT)
Dept: RADIOLOGY | Facility: CLINIC | Age: 52
End: 2022-03-14
Payer: COMMERCIAL

## 2022-03-14 VITALS
BODY MASS INDEX: 35.82 KG/M2 | OXYGEN SATURATION: 98 % | HEIGHT: 65 IN | HEART RATE: 95 BPM | TEMPERATURE: 99.2 F | RESPIRATION RATE: 18 BRPM | WEIGHT: 215 LBS

## 2022-03-14 DIAGNOSIS — U07.1 PNEUMONIA DUE TO COVID-19 VIRUS: Primary | ICD-10-CM

## 2022-03-14 DIAGNOSIS — R05.9 COUGH: ICD-10-CM

## 2022-03-14 DIAGNOSIS — R06.02 SOB (SHORTNESS OF BREATH): ICD-10-CM

## 2022-03-14 DIAGNOSIS — J12.82 PNEUMONIA DUE TO COVID-19 VIRUS: Primary | ICD-10-CM

## 2022-03-14 PROCEDURE — 71046 X-RAY EXAM CHEST 2 VIEWS: CPT

## 2022-03-14 PROCEDURE — 99213 OFFICE O/P EST LOW 20 MIN: CPT | Performed by: PHYSICIAN ASSISTANT

## 2022-03-14 RX ORDER — TRAZODONE HYDROCHLORIDE 50 MG/1
TABLET ORAL
COMMUNITY
Start: 2022-03-12

## 2022-03-14 RX ORDER — PREGABALIN 100 MG/1
100 CAPSULE ORAL 3 TIMES DAILY
COMMUNITY
Start: 2022-02-23

## 2022-03-14 RX ORDER — AZITHROMYCIN 250 MG/1
TABLET, FILM COATED ORAL
Qty: 6 TABLET | Refills: 0 | Status: SHIPPED | OUTPATIENT
Start: 2022-03-14 | End: 2022-03-18

## 2022-03-14 RX ORDER — CHOLESTYRAMINE 4 G/9G
POWDER, FOR SUSPENSION ORAL
COMMUNITY
Start: 2022-02-02

## 2022-03-14 NOTE — PATIENT INSTRUCTIONS
Pneumonia   AMBULATORY CARE:   Pneumonia  is an infection in your lungs caused by bacteria, viruses, fungi, or parasites  You can become infected if you come in contact with someone who is sick  You can get pneumonia if you recently had surgery or needed a ventilator to help you breathe  Pneumonia can also be caused by accidentally inhaling saliva or small pieces of food  Pneumonia may cause mild symptoms, or it can be severe and life-threatening  Common symptoms include the following:   · Fever or chills    · Cough    · Shortness of breath or rapid breathing    · Chest pain when you cough or breathe deeply    · Headache    · Vomiting    · Fatigue or confusion    Seek care immediately if:   · You cough up blood  · Your heart beats more than 100 beats in 1 minute  · You are very tired, confused, and cannot think clearly  · You have chest pain or trouble breathing  · Your lips or fingernails turn gray or blue  Call your doctor if:   · Your symptoms are the same or get worse 48 hours after you start antibiotics  · Your fever is not below 99°F (37 2°C) 48 hours after you start antibiotics  · You have a fever higher than 101°F (38 3°C)  · You cannot eat, or you have loss of appetite, nausea, or are vomiting  · You have questions or concerns about your condition or care  Treatment  may include any of the following:  · Antibiotics  help treat pneumonia caused by bacteria  · Acetaminophen  decreases pain and fever  It is available without a doctor's order  Ask how much to take and how often to take it  Follow directions  Read the labels of all other medicines you are using to see if they also contain acetaminophen, or ask your doctor or pharmacist  Acetaminophen can cause liver damage if not taken correctly  Do not use more than 4 grams (4,000 milligrams) total of acetaminophen in one day  · NSAIDs , such as ibuprofen, help decrease swelling, pain, and fever   This medicine is available with or without a doctor's order  NSAIDs can cause stomach bleeding or kidney problems in certain people  If you take blood thinner medicine, always ask your healthcare provider if NSAIDs are safe for you  Always read the medicine label and follow directions  · Airway clearance techniques  are exercises to help remove mucus so you can breathe more easily  Your healthcare provider will show you how to do the exercises  These exercises may be used along with machines or devices to help decrease your symptoms  · Respiratory support  is given to help you breathe  You may receive oxygen to increase the level of oxygen in your blood  You may also need a machine to help you breathe  Manage your symptoms:   · Rest as needed  Rest often throughout the day  Alternate times of activity with times of rest     · Drink liquids as directed  Ask how much liquid to drink each day and which liquids are best for you  Liquids help thin your mucus, which may make it easier for you to cough it up  · Do not smoke  Avoid secondhand smoke  Smoking increases your risk for pneumonia  Smoking also makes it harder for you to get better after you have had pneumonia  Ask your healthcare provider for information if you need help to quit smoking  · Limit alcohol  Women should limit alcohol to 1 drink a day  Men should limit alcohol to 2 drinks a day  A drink of alcohol is 12 ounces of beer, 5 ounces of wine, or 1½ ounces of liquor  · Use a cool mist humidifier  A humidifier will help increase air moisture in your home  This may make it easier for you to breathe and help decrease your cough  · Keep your head elevated  You may be able to breathe better if you lie down with the head of your bed up  Prevent pneumonia:   · Wash your hands often  Use soap and water every time you wash your hands  Rub your soapy hands together, lacing your fingers   Use the fingers of one hand to scrub under the nails of the other hand  Wash for at least 20 seconds  Rinse with warm, running water for several seconds  Then dry your hands with a clean towel or paper towel  Use hand  that contains alcohol if soap and water are not available  Do not touch your eyes, nose, or mouth without washing your hands first          · Cover a sneeze or cough  Use a tissue that covers your mouth and nose  Throw the tissue away in a trash can right away  Use the bend of your arm if a tissue is not available  Wash your hands well with soap and water or use a hand   Do not stand close to anyone who is sneezing or coughing  · Stay away from others until you are well  Do not go to work or other activities  Wait until your symptoms are gone or your healthcare provider says it is okay to return  · Ask about vaccines you may need  You may need a vaccine to help prevent pneumonia  Get an influenza (flu) vaccine every year as soon as recommended, usually in September or October  Flu viruses change, so it is important to get a yearly flu vaccine  Follow up with your doctor as directed: You will need to return for more tests  Write down your questions so you remember to ask them during your visits  © Copyright [employer] 2022 Information is for End User's use only and may not be sold, redistributed or otherwise used for commercial purposes  All illustrations and images included in CareNotes® are the copyrighted property of [employer]  or Naima Hutson  The above information is an  only  It is not intended as medical advice for individual conditions or treatments  Talk to your doctor, nurse or pharmacist before following any medical regimen to see if it is safe and effective for you

## 2023-08-22 ENCOUNTER — CONSULT (OUTPATIENT)
Dept: PULMONOLOGY | Facility: CLINIC | Age: 53
End: 2023-08-22
Payer: COMMERCIAL

## 2023-08-22 VITALS
OXYGEN SATURATION: 97 % | HEART RATE: 91 BPM | DIASTOLIC BLOOD PRESSURE: 72 MMHG | BODY MASS INDEX: 38.79 KG/M2 | SYSTOLIC BLOOD PRESSURE: 128 MMHG | HEIGHT: 65 IN | RESPIRATION RATE: 18 BRPM | WEIGHT: 232.8 LBS

## 2023-08-22 DIAGNOSIS — R05.2 SUBACUTE COUGH: Primary | ICD-10-CM

## 2023-08-22 PROCEDURE — 99204 OFFICE O/P NEW MOD 45 MIN: CPT | Performed by: INTERNAL MEDICINE

## 2023-08-22 RX ORDER — ALBUTEROL SULFATE 90 UG/1
2 AEROSOL, METERED RESPIRATORY (INHALATION) EVERY 6 HOURS PRN
Qty: 18 G | Refills: 1 | Status: SHIPPED | OUTPATIENT
Start: 2023-08-22

## 2023-08-22 NOTE — PROGRESS NOTES
Pulmonary Consultation   Rubia Castellanos 50 y.o. female MRN: [id number]  8/22/2023      Assessment:    Subacute cough  Patient is a 51-year-old woman, former smoker who quit 20 years ago, who presents to the pulmonary office for evaluation of a subacute cough for the past 6 to 8 weeks context of recent COVID diagnosis. I suspect that her cough is postinfectious. However differential diagnosis includes unmasking of her previous asthma. Less likely COPD given very limited tobacco history. Given the timing of the cough in the context of her COVID infection I suspect that is related to that versus an ACE inhibitor induced cough but this should also be considered if there is persistence. We will treat for subacute cough which typically involves supportive care but if her cough does last longer will need to consider chronic cough differential (ACE inhibitor, refractory GERD, nonasthmatic eosinophilic bronchitis, asthma, etc.)    Plan  Given samples of Breo ellipta and shown how to use it. Wiill use for the next 4 weeks  Albuterol as needed shortness of breath/wheezing  Follow-up in approximately 1 month      Plan:    Diagnoses and all orders for this visit:    Subacute cough  -     albuterol (Ventolin HFA) 90 mcg/act inhaler; Inhale 2 puffs every 6 (six) hours as needed for wheezing        No follow-ups on file. History of Present Illness   HPI:  Rubia Castellanos is a 50 y.o. female diagnosed with covid-19,  6-8 weeks ago. She was treated with paxlovid. She did not require hospitalization. Since then she has had increased cough, sob, --to the point she develops panic attacks. She also wheezes. Also reports reflux. She reports she smoked for 6-8 years, but quit 20 years ago. During that time she was diagnosed with asthma. After she quit, her asthma and tonsillitis went away. She has tried flovent 110 mcg  (using an old inhaler), ventolin (old inhaler) w/o much relief. She is prescribed an acei.   She denies any systemic symptoms including fevers, chills or sweats. She has pet dogs but denies allergies to them. Does not have any acute complaints today    Review of Systems   Constitutional: Negative for chills, fatigue and fever. HENT: Negative for congestion, nosebleeds, postnasal drip, rhinorrhea, sinus pressure and sore throat. Eyes: Negative for discharge, redness and itching. Respiratory: Positive for cough. Negative for choking, chest tightness, shortness of breath, wheezing and stridor. Cardiovascular: Negative for chest pain, palpitations and leg swelling. Gastrointestinal: Negative for blood in stool. Genitourinary: Negative for difficulty urinating and dysuria. Musculoskeletal: Negative for arthralgias, joint swelling and myalgias. Skin: Negative for color change and rash. Neurological: Negative for light-headedness and headaches. Hematological: Negative for adenopathy. Historical Information   Past Medical History:   Diagnosis Date   • Hypertension      Past Surgical History:   Procedure Laterality Date   • BREAST SURGERY     • CARPAL TUNNEL RELEASE     •  SECTION       History reviewed. No pertinent family history.     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[address] [place]  Tobacco: none  Alcohol: none  Illicits:  None   Hobbies: none   Pets: 2 dogs      Meds/Allergies     Current Outpatient Medications:   •  albuterol (Ventolin HFA) 90 mcg/act inhaler, Inhale 2 puffs every 6 (six) hours as needed for wheezing, Disp: 18 g, Rfl: 5  •  albuterol (Ventolin HFA) 90 mcg/act inhaler, Inhale 2 puffs every 6 (six) hours as needed for wheezing, Disp: 18 g, Rfl: 1  •  cholestyramine (QUESTRAN) 4 g packet, USE 1 PACKET BY MOUTH DAILY, Disp: , Rfl:   •  dicyclomine (BENTYL) 20 mg tablet, Take 1 tablet (20 mg total) by mouth 4 (four) times a day as needed (abdominal cramps) for up to 7 days, Disp: 20 tablet, Rfl: 0  •  esomeprazole (NexIUM) 40 MG capsule, Take 40 mg by mouth 2 (two) times a day, Disp: , Rfl:   •  fluticasone (FLONASE) 50 mcg/act nasal spray, 1 spray into each nostril daily, Disp: 1 Bottle, Rfl: 5  •  lisinopril-hydrochlorothiazide (PRINZIDE,ZESTORETIC) 20-12.5 MG per tablet, Take 2 tablets by mouth daily, Disp: , Rfl:   •  montelukast (SINGULAIR) 10 mg tablet, TAKE 1 TABLET BY MOUTH DAILY AT BEDTIME, Disp: 30 tablet, Rfl: 3  •  pregabalin (LYRICA) 100 mg capsule, Take 100 mg by mouth 3 (three) times a day, Disp: , Rfl:   •  traZODone (DESYREL) 50 mg tablet, , Disp: , Rfl:   •  benzonatate (TESSALON) 200 MG capsule, Take 1 capsule (200 mg total) by mouth 3 (three) times a day as needed for cough (Patient not taking: Reported on 2/28/2020), Disp: 20 capsule, Rfl: 0  •  FEROSUL 325 (65 Fe) MG tablet, Take 1 tablet by mouth 2 (two) times a day (Patient not taking: Reported on 3/14/2022), Disp: , Rfl:   •  levocetirizine (XYZAL) 5 MG tablet, Take 1 tablet (5 mg total) by mouth every evening (Patient not taking: Reported on 8/18/2020), Disp: 30 tablet, Rfl: 5  •  lisinopril (ZESTRIL) 10 mg tablet, Take 10 mg by mouth daily (Patient not taking: Reported on 8/22/2023), Disp: , Rfl:   •  ondansetron (ZOFRAN-ODT) 4 mg disintegrating tablet, Take 1 tablet (4 mg total) by mouth every 8 (eight) hours as needed for nausea or vomiting for up to 7 days (Patient not taking: Reported on 8/22/2023), Disp: 20 tablet, Rfl: 0  No Known Allergies    Vitals: Blood pressure 128/72, pulse 91, resp. rate 18, height 5' 4.75" (1.645 m), weight 106 kg (232 lb 12.8 oz), SpO2 97 %. Body mass index is 39.04 kg/m². Oxygen Therapy  SpO2: 97 %  Oxygen Therapy: None (Room air)      Physical Exam  Physical Exam  Vitals reviewed. Constitutional:       General: She is not in acute distress. Appearance: She is well-developed. She is not ill-appearing, toxic-appearing or diaphoretic. HENT:      Head: Normocephalic and atraumatic.       Right Ear: External ear normal. Left Ear: External ear normal.      Nose: Nose normal. No congestion or rhinorrhea. Mouth/Throat:      Pharynx: No oropharyngeal exudate or posterior oropharyngeal erythema. Eyes:      General: No scleral icterus. Right eye: No discharge. Left eye: No discharge. Conjunctiva/sclera: Conjunctivae normal.      Pupils: Pupils are equal, round, and reactive to light. Neck:      Trachea: No tracheal deviation. Cardiovascular:      Rate and Rhythm: Normal rate and regular rhythm. Heart sounds: Normal heart sounds. No murmur heard. No friction rub. No gallop. Pulmonary:      Effort: Pulmonary effort is normal.      Breath sounds: Normal breath sounds. No stridor. No wheezing or rales. Musculoskeletal:      Cervical back: Normal range of motion. Right lower leg: No edema. Left lower leg: No edema. Lymphadenopathy:      Head:      Right side of head: No submental, submandibular, preauricular or posterior auricular adenopathy. Left side of head: No submental, submandibular, preauricular or posterior auricular adenopathy. Cervical: No cervical adenopathy. Skin:     General: Skin is warm and dry. Findings: No lesion or rash. Neurological:      Mental Status: She is alert and oriented to person, place, and time. Labs: I have personally reviewed pertinent lab results. Lab Results   Component Value Date    WBC 9.61 08/18/2020    HGB 14.3 08/18/2020    HCT 41.6 08/18/2020    MCV 89 08/18/2020     08/18/2020     Lab Results   Component Value Date    CALCIUM 8.7 08/18/2020    K 3.5 08/18/2020    CO2 27 08/18/2020    CL 98 (L) 08/18/2020    BUN 10 08/18/2020    CREATININE 0.98 08/18/2020     No results found for: "IGE"  Lab Results   Component Value Date    ALT 46 08/18/2020    AST 30 08/18/2020    ALKPHOS 87 08/18/2020       Imaging and other studies: I have personally reviewed pertinent reports.    and I have personally reviewed pertinent films in PACS    CXR 3/2022     FINDINGS:     Cardiomediastinal silhouette appears unremarkable.     The lungs are clear. No pneumothorax or pleural effusion.     Osseous structures appear within normal limits for patient age.     IMPRESSION:     No acute cardiopulmonary disease.       Pulmonary function testing:   None on file       Gretchen Zhou M.D.   [facility]

## 2023-08-22 NOTE — ASSESSMENT & PLAN NOTE
Patient is a 61-year-old woman, former smoker who quit 20 years ago, who presents to the pulmonary office for evaluation of a subacute cough for the past 6 to 8 weeks context of recent COVID diagnosis. I suspect that her cough is postinfectious. However differential diagnosis includes unmasking of her previous asthma. Less likely COPD given very limited tobacco history. Given the timing of the cough in the context of her COVID infection I suspect that is related to that versus an ACE inhibitor induced cough but this should also be considered if there is persistence. We will treat for subacute cough which typically involves supportive care but if her cough does last longer will need to consider chronic cough differential (ACE inhibitor, refractory GERD, nonasthmatic eosinophilic bronchitis, asthma, etc.)    Plan  Given samples of Breo ellipta and shown how to use it.   Wiill use for the next 4 weeks  Albuterol as needed shortness of breath/wheezing  Follow-up in approximately 1 month

## 2023-10-21 PROBLEM — R05.2 SUBACUTE COUGH: Status: RESOLVED | Noted: 2023-08-22 | Resolved: 2023-10-21

## 2024-01-24 ENCOUNTER — OFFICE VISIT (OUTPATIENT)
Dept: PULMONOLOGY | Facility: CLINIC | Age: 54
End: 2024-01-24
Payer: COMMERCIAL

## 2024-01-24 VITALS
WEIGHT: 234 LBS | SYSTOLIC BLOOD PRESSURE: 124 MMHG | BODY MASS INDEX: 38.99 KG/M2 | TEMPERATURE: 98.6 F | HEART RATE: 87 BPM | HEIGHT: 65 IN | OXYGEN SATURATION: 99 % | DIASTOLIC BLOOD PRESSURE: 78 MMHG

## 2024-01-24 DIAGNOSIS — R05.3 CHRONIC COUGH: Primary | ICD-10-CM

## 2024-01-24 PROCEDURE — 99214 OFFICE O/P EST MOD 30 MIN: CPT | Performed by: INTERNAL MEDICINE

## 2024-01-24 RX ORDER — AZELASTINE 1 MG/ML
1 SPRAY, METERED NASAL 2 TIMES DAILY
Qty: 30 ML | Refills: 5 | Status: SHIPPED | OUTPATIENT
Start: 2024-01-24

## 2024-01-24 NOTE — PROGRESS NOTES
Pulmonary Follow Up Note   Anna Danielle 53 y.o. female MRN: [id number]  1/24/2024    Assessment:  Chronic cough  Ddx post viral/COVID-19, reversible airway disease/asthma, PND/chronic sinusitis, ACEI, related to acid reflux/uncontrolled  Described as coughing spells/following p.o. intake by 15 minutes, associated with wheezing  No significant change/improvement with a trial of Breo for 1 week  Smoked briefly/quit in 2004/had a remote history of asthma  Clear lung fields on exam/normal chest x-ray no infiltrates    Plan:  Discussed extensively about potential etiologies  She will call PCP/switch from ACEI/lisinopril to another agent  Added Astelin nasal spray: States that she had no response to Flonase/currently on Afrin, sees ENT still with nasal congestion  Continue Nexium/still ongoing workup by GI for the acid reflux/continues to have heartburn  Counseled about antireflux measures  Check methacholine challenge test check for asthma  Given the chronicity/check noncontrast CT chest      Return in about 8 weeks (around 3/20/2024).    History of Present Illness     Follow up for: Chronic cough    Background:  53 y.o. female with a h/o HTN, class 2 obesity, former tobacco abuse quit in 2004    Last seen by pulmonary 2/2020, noted allergic cough/due to sinus congestion. Started Montelukast, cetrizine.    Reestablished of care 8/2023 for subacute cough.  This was ongoing for 6 to 8 weeks/had COVID-19 diagnosis before which was treated with Paxlovid.  Spectated postinfectious cough/unmask asthma.  Given samples of Breo Ellipta, advised to discontinue ACEI.    Interval History  Since last seen, did not feel a difference with Breo/use December for 7 days.  Continued to have coughing spells/and wheezing, noted to be around the time of eating, also with exertion.  Partially improved with albuterol.  Continue to use lisinopril/did not switch.      Review of Systems  As per hpi, all other systems reviewed and were  "negative    Studies:    Imaging and other studies: I have personally reviewed pertinent films in PACS      Pulmonary function testing:       EKG, Pathology, and Other Studies: I have personally reviewed pertinent reports.        Past medical, surgical, social and family histories reviewed.      Medications/Allergies: Reviewed      Vitals: Blood pressure 124/78, pulse 87, temperature 98.6 °F (37 °C), temperature source Tympanic, height 5' 4.75\" (1.645 m), weight 106 kg (234 lb), SpO2 99%. Body mass index is 39.24 kg/m². Oxygen Therapy  SpO2: 99 %  Oxygen Therapy: None (Room air)      Physical Exam  Body mass index is 39.24 kg/m².   Gen: not in acute distress, obese  Neck/Eyes: supple, no adenopathy, PERRL  Ear: normal appearance, no significant hearing impairment  Nose:  normal nasal mucosa, no drainage  Mouth:  unremarkable/normal appearance of lips, teeth and gums  Oropharynx: mucosa is moist, no focal lesions or erythema  Salivary glands: soft nontender  Chest: normal respiratory efforts, clear breath sounds bilaterally  CV: RRR, no murmurs appreciated, no edema  Abdomen: soft, non tender  Extremities:  No observed deformity   Skin: unremarkable  Neuro: AAO X3, no focal motor deficit        Labs:  Lab Results   Component Value Date    WBC 9.61 08/18/2020    HGB 14.3 08/18/2020    HCT 41.6 08/18/2020    MCV 89 08/18/2020     08/18/2020     Lab Results   Component Value Date    CALCIUM 8.7 08/18/2020    K 3.5 08/18/2020    CO2 27 08/18/2020    CL 98 (L) 08/18/2020    BUN 10 08/18/2020    CREATININE 0.98 08/18/2020     No results found for: \"IGE\"  Lab Results   Component Value Date    ALT 46 08/18/2020    AST 30 08/18/2020    ALKPHOS 87 08/18/2020           Portions of the record may have been created with voice recognition software.  Occasional wrong word or \"sound a like\" substitutions may have occurred due to the inherent limitations of voice recognition software.  Read the chart carefully and recognize, " using context, where substitutions have occurred    Jesus Adorno M.D.  [facility]

## 2024-01-31 ENCOUNTER — TELEPHONE (OUTPATIENT)
Age: 54
End: 2024-01-31

## 2024-01-31 NOTE — TELEPHONE ENCOUNTER
Janee called from prior [employer] for patients CT scan. They had sent multiple staff messages regarding a peer to peer that needs to be completed today for the patients CT scan on 2/5/24. They need the Doctor or an AP to please call the number below and complete the P2P, as it does not need to be scheduled. Please advise as soon as possible.    [place]  Phone #[phone]  Case #[id number]

## 2025-07-28 DIAGNOSIS — G89.4 CHRONIC PAIN SYNDROME: Primary | ICD-10-CM

## 2025-07-28 RX ORDER — CELECOXIB 100 MG/1
100 CAPSULE ORAL 2 TIMES DAILY
Qty: 60 CAPSULE | Refills: 5 | Status: SHIPPED | OUTPATIENT
Start: 2025-07-28

## 2025-07-28 RX ORDER — CELECOXIB 100 MG/1
CAPSULE ORAL
Qty: 60 CAPSULE | OUTPATIENT
Start: 2025-07-28

## 2025-08-05 PROBLEM — G47.33 OBSTRUCTIVE SLEEP APNEA SYNDROME: Status: ACTIVE | Noted: 2025-08-05

## 2025-08-05 PROBLEM — E66.01 MORBID (SEVERE) OBESITY DUE TO EXCESS CALORIES (HCC): Status: ACTIVE | Noted: 2025-08-05

## 2025-08-05 PROBLEM — E28.2 POLYCYSTIC OVARIAN SYNDROME: Status: ACTIVE | Noted: 2025-08-05

## 2025-08-05 PROBLEM — I10 ESSENTIAL (PRIMARY) HYPERTENSION: Status: ACTIVE | Noted: 2025-08-05

## 2025-08-05 PROBLEM — D25.9 LEIOMYOMA OF UTERUS, UNSPECIFIED: Status: ACTIVE | Noted: 2025-08-05

## 2025-08-05 PROBLEM — N92.0 MENORRHAGIA: Status: ACTIVE | Noted: 2025-08-05

## 2025-08-05 PROBLEM — D50.9 IRON DEFICIENCY ANEMIA, UNSPECIFIED: Status: ACTIVE | Noted: 2025-08-05

## 2025-08-05 PROBLEM — G89.29 OTHER CHRONIC PAIN: Status: ACTIVE | Noted: 2025-08-05

## 2025-08-05 PROBLEM — G47.00 INSOMNIA, UNSPECIFIED: Status: ACTIVE | Noted: 2025-08-05

## 2025-08-05 PROBLEM — G43.909 MIGRAINE HEADACHE: Status: ACTIVE | Noted: 2025-08-05

## 2025-08-05 PROBLEM — N83.201 UNSPECIFIED OVARIAN CYST, RIGHT SIDE: Status: ACTIVE | Noted: 2025-08-05

## 2025-08-05 PROBLEM — M54.16 RADICULOPATHY, LUMBAR REGION: Status: ACTIVE | Noted: 2025-08-05

## 2025-08-05 PROBLEM — M17.0 BILATERAL PRIMARY OSTEOARTHRITIS OF KNEE: Status: ACTIVE | Noted: 2025-08-05

## 2025-08-05 PROBLEM — H53.8 BLURRING OF VISUAL IMAGE OF BOTH EYES: Status: ACTIVE | Noted: 2025-08-05

## 2025-08-05 PROBLEM — G62.9 POLYNEUROPATHY, UNSPECIFIED: Status: ACTIVE | Noted: 2025-08-05

## 2025-08-05 PROBLEM — N63.10 UNSPECIFIED LUMP IN THE RIGHT BREAST, UNSPECIFIED QUADRANT: Status: ACTIVE | Noted: 2025-08-05

## 2025-08-21 DIAGNOSIS — G47.33 OBSTRUCTIVE SLEEP APNEA SYNDROME: Primary | ICD-10-CM

## 2025-08-21 RX ORDER — LORAZEPAM 0.5 MG/1
0.5 TABLET ORAL EVERY 6 HOURS PRN
COMMUNITY
Start: 2025-08-20

## 2025-08-21 RX ORDER — TIRZEPATIDE 15 MG/.5ML
15 INJECTION, SOLUTION SUBCUTANEOUS WEEKLY
COMMUNITY
Start: 2025-07-29 | End: 2025-08-21 | Stop reason: SDUPTHER

## 2025-08-21 RX ORDER — PAROXETINE 10 MG/1
1 TABLET, FILM COATED ORAL DAILY
COMMUNITY
Start: 2025-08-02

## 2025-08-22 RX ORDER — TIRZEPATIDE 15 MG/.5ML
15 INJECTION, SOLUTION SUBCUTANEOUS WEEKLY
Qty: 2 ML | Refills: 6 | Status: SHIPPED | OUTPATIENT
Start: 2025-08-22

## 2025-08-22 RX ORDER — TIRZEPATIDE 15 MG/.5ML
15 INJECTION, SOLUTION SUBCUTANEOUS WEEKLY
Qty: 2 ML | Refills: 6 | Status: SHIPPED | OUTPATIENT
Start: 2025-08-22 | End: 2025-08-22 | Stop reason: SDUPTHER